# Patient Record
(demographics unavailable — no encounter records)

---

## 2024-10-17 NOTE — PHYSICAL EXAM
[Eitan: ____] : Eitan [unfilled] [Sacral Dimple] : no sacral dimple [Tuft of Hair] : no tuft of hair [Straight] : straight [NL] : normotonic [FreeTextEntry5] : icteric sclera [de-identified] : jaundice face

## 2024-10-17 NOTE — RISK ASSESSMENT
[Does not require G6PD quantitative test] : Does not require G6PD quantitative test  [Presents with hemolytic anemia] : Does not present with hemolytic anemia  [Presents with hemolytic jaundice] : Does not present with hemolytic jaundice  [Presents with early onset increasing  jaundice persisting beyond the first week of life (bilirubin level greater than the 40th percentile] : Does not present with early onset increasing  jaundice persisting beyond the first week of life (bilirubin level greater than the 40th percentile for age in hours)   [Is admitted to the hospital for jaundice following discharge] : Is not admitted to the hospital for jaundice following discharge   [Has a racial, or ethnic risk of G6PD deficiency (, , Mediterranean, or  ancestry)] : Does not have a racial, or ethnic risk of G6PD deficiency (, , Mediterranean, or  ancestry)  [Has family history of G6PD deficiency (Symptoms include anemia and jaundice following illness, ingestion of osmani beans or bitter melon,] : Does not have family history of G6PD deficiency (Symptoms include anemia and jaundice following illness, ingestion of osmani beans or bitter melon, exposure to barbra compounds or mothballs, or after taking certain medications (including but not limited to sulfa-containing drugs, primaquine, dapsone, fluoroquinolones, nitrofurantoin, pyridium, sulfonylureas, etc.)

## 2024-10-17 NOTE — HISTORY OF PRESENT ILLNESS
[de-identified] : Weight check, jaundice [FreeTextEntry6] : Breastfeeding ad mich, taking pumped breast milk and formula 2-2.5 oz every 2-3 hours. Making at least 6 wet diapers per day. Stooling 3-4 times per day, yellow in color.  No spit ups.

## 2024-10-17 NOTE — HISTORY OF PRESENT ILLNESS
[Born at ___ Wks Gestation] : The patient was born at [unfilled] weeks gestation [] : via normal spontaneous vaginal delivery [[facility]] : at [facility] [Age: ___] : [unfilled] year old mother [Breast milk] : breast milk [Formula ___ oz/feed] : [unfilled] oz of formula per feed [Normal] : Normal [___ voids per day] : [unfilled] voids per day [per day] : per day. [Yellow] : yellow [In Bassinet/Crib] : sleeps in bassinet/crib [On back] : sleeps on back [Rear facing car seat in back seat] : Rear facing car seat in back seat [Hepatitis B Vaccine Given] : Hepatitis B vaccine given [NO] : No [BW: _____] : weight of [unfilled] [Length: _____] : length of [unfilled] [HC: _____] : head circumference of [unfilled] [(1) _____] : [unfilled] [(5) _____] : [unfilled] [G: ___] : G [unfilled] [P: ___] : P [unfilled] [Rubella (Immune)] : Rubella immune [None] : There are no risk factors [] : positive [TsB: _____] : Total Serum Bilirubin [unfilled] mg/dL [Hours between feeds ___] : Child is fed every [unfilled] hours [Frequency of stools: ___] : Frequency of stools: [unfilled]  stools [Smoke Detectors] : Smoke detectors at home. [FreeTextEntry1] : 13.7 [Carbon Monoxide Detectors] : Carbon monoxide detectors at home [No] : No [RSV vaccine] : RSV vaccine not received by mother at least 14 days prior to delivery [HepBsAG] : HepBsAg negative [HIV] : HIV negative [HepC] : Hepatitis C negative [GBS] : GBS negative [FreeTextEntry9] : A+ [de-identified] : 60 [FreeTextEntry8] : SGA, fingerstick glucose checks performed.  [Co-sleeping] : no co-sleeping [Loose bedding, pillow, toys, and/or bumpers in crib] : no loose bedding, pillow, toys, and/or bumpers in crib [Pacifier] : Not using pacifier [Exposure to electronic nicotine delivery system] : No exposure to electronic nicotine delivery system

## 2024-10-17 NOTE — PHYSICAL EXAM
[Eitan: ____] : Eitan [unfilled] [Sacral Dimple] : no sacral dimple [Tuft of Hair] : no tuft of hair [Straight] : straight [NL] : normotonic [FreeTextEntry5] : icteric sclera [de-identified] : jaundice face

## 2024-10-17 NOTE — PHYSICAL EXAM
[Alert] : alert [Normocephalic] : normocephalic [Flat Open Anterior [place]] : flat open anterior fontanelle [PERRL] : PERRL [Red Reflex Bilateral] : red reflex bilateral [Normally Placed Ears] : normally placed ears [Auricles Well Formed] : auricles well formed [Clear Tympanic membranes] : clear tympanic membranes [Light reflex present] : light reflex present [Bony structures visible] : bony structures visible [Patent Auditory Canal] : patent auditory canal [Nares Patent] : nares patent [Palate Intact] : palate intact [Uvula Midline] : uvula midline [Supple, full passive range of motion] : supple, full passive range of motion [Symmetric Chest Rise] : symmetric chest rise [Clear to Auscultation Bilaterally] : clear to auscultation bilaterally [Regular Rate and Rhythm] : regular rate and rhythm [S1, S2 present] : S1, S2 present [+2 Femoral Pulses] : +2 femoral pulses [Soft] : soft [Bowel Sounds] : bowel sounds present [Umbilical Stump Dry, Clean, Intact] : umbilical stump dry, clean, intact [Normal external genitalia] : normal external genitalia [Patent Vagina] : patent vagina [Patent] : patent [Normally Placed] : normally placed [No Abnormal Lymph Nodes Palpated] : no abnormal lymph nodes palpated [Symmetric Flexed Extremities] : symmetric flexed extremities [Startle Reflex] : startle reflex present [Suck Reflex] : suck reflex present [Rooting] : rooting reflex present [Palmar Grasp] : palmar grasp present [Plantar Grasp] : plantar reflex present [Symmetric Kenji] : symmetric [place] [Icteric sclera] : icteric sclera [Jaundice] : jaundice [Acute Distress] : no acute distress [Discharge] : no discharge [Palpable Masses] : no palpable masses [Murmurs] : no murmurs [Tender] : nontender [Distended] : not distended [Hepatomegaly] : no hepatomegaly [Splenomegaly] : no splenomegaly [Clitoromegaly] : no clitoromegaly [Mayberry-Ortolani] : negative Mayberry-Ortolani [Spinal Dimple] : no spinal dimple [Tuft of Hair] : no tuft of hair

## 2024-10-17 NOTE — DISCUSSION/SUMMARY
[Normal Growth] : growth [Normal Development] : developmental [No Elimination Concerns] : elimination [Continue Regimen] : feeding [No Skin Concerns] : skin [Normal Sleep Pattern] : sleep [None] : no known medical problems [Anticipatory Guidance Given] : Anticipatory guidance addressed as per the history of present illness section [ Transition] :  transition [ Care] :  care [Nutritional Adequacy] : nutritional adequacy [Parental Well-Being] : parental well-being [Safety] : safety [Hepatitis B In Hospital] : Hepatitis B administered while in the hospital [No Vaccines] : no vaccines needed [No Medications] : ~He/She~ is not on any medications [Parent/Guardian] : Parent/Guardian [] : The components of the vaccine(s) to be administered today are listed in the plan of care. The disease(s) for which the vaccine(s) are intended to prevent and the risks have been discussed with the caretaker.  The risks are also included in the appropriate vaccination information statements which have been provided to the patient's caregiver.  The caregiver has given consent to vaccinate. [FreeTextEntry1] : Recommend exclusive breastfeeding, 8-12 feedings per day. Mother should continue prenatal vitamins and avoid alcohol. If formula is needed, recommend iron-fortified formulations, 2-4 oz every 2-3 hrs. When in car, patient should be in rear-facing car seat in back seat. Put baby to sleep on back, in own crib with no loose or soft bedding. Help baby to develop sleep and feeding routines. May offer pacifier if needed. Start tummy time when awake. Limit baby's exposure to others, especially those with fever or unknown vaccine status. Parents counseled to call if rectal temperature >100.4 degrees F.  Referral for counseling for positive postpartum depression screen.

## 2024-10-17 NOTE — HISTORY OF PRESENT ILLNESS
[Born at ___ Wks Gestation] : The patient was born at [unfilled] weeks gestation [] : via normal spontaneous vaginal delivery [[facility]] : at [facility] [Age: ___] : [unfilled] year old mother [Breast milk] : breast milk [Formula ___ oz/feed] : [unfilled] oz of formula per feed [Normal] : Normal [___ voids per day] : [unfilled] voids per day [per day] : per day. [Yellow] : yellow [In Bassinet/Crib] : sleeps in bassinet/crib [On back] : sleeps on back [Rear facing car seat in back seat] : Rear facing car seat in back seat [Hepatitis B Vaccine Given] : Hepatitis B vaccine given [NO] : No [BW: _____] : weight of [unfilled] [Length: _____] : length of [unfilled] [HC: _____] : head circumference of [unfilled] [(1) _____] : [unfilled] [(5) _____] : [unfilled] [G: ___] : G [unfilled] [P: ___] : P [unfilled] [Rubella (Immune)] : Rubella immune [None] : There are no risk factors [] : positive [TsB: _____] : Total Serum Bilirubin [unfilled] mg/dL [Hours between feeds ___] : Child is fed every [unfilled] hours [Frequency of stools: ___] : Frequency of stools: [unfilled]  stools [Smoke Detectors] : Smoke detectors at home. [FreeTextEntry1] : 13.7 [Carbon Monoxide Detectors] : Carbon monoxide detectors at home [No] : No [RSV vaccine] : RSV vaccine not received by mother at least 14 days prior to delivery [HepBsAG] : HepBsAg negative [HIV] : HIV negative [HepC] : Hepatitis C negative [GBS] : GBS negative [FreeTextEntry9] : A+ [de-identified] : 60 [FreeTextEntry8] : SGA, fingerstick glucose checks performed.  [Co-sleeping] : no co-sleeping [Loose bedding, pillow, toys, and/or bumpers in crib] : no loose bedding, pillow, toys, and/or bumpers in crib [Pacifier] : Not using pacifier [Exposure to electronic nicotine delivery system] : No exposure to electronic nicotine delivery system

## 2024-10-17 NOTE — PHYSICAL EXAM
[Alert] : alert [Normocephalic] : normocephalic [Flat Open Anterior [place]] : flat open anterior fontanelle [PERRL] : PERRL [Red Reflex Bilateral] : red reflex bilateral [Normally Placed Ears] : normally placed ears [Auricles Well Formed] : auricles well formed [Clear Tympanic membranes] : clear tympanic membranes [Light reflex present] : light reflex present [Bony structures visible] : bony structures visible [Patent Auditory Canal] : patent auditory canal [Nares Patent] : nares patent [Palate Intact] : palate intact [Uvula Midline] : uvula midline [Supple, full passive range of motion] : supple, full passive range of motion [Symmetric Chest Rise] : symmetric chest rise [Clear to Auscultation Bilaterally] : clear to auscultation bilaterally [Regular Rate and Rhythm] : regular rate and rhythm [S1, S2 present] : S1, S2 present [+2 Femoral Pulses] : +2 femoral pulses [Soft] : soft [Bowel Sounds] : bowel sounds present [Umbilical Stump Dry, Clean, Intact] : umbilical stump dry, clean, intact [Normal external genitalia] : normal external genitalia [Patent Vagina] : patent vagina [Patent] : patent [Normally Placed] : normally placed [No Abnormal Lymph Nodes Palpated] : no abnormal lymph nodes palpated [Symmetric Flexed Extremities] : symmetric flexed extremities [Startle Reflex] : startle reflex present [Suck Reflex] : suck reflex present [Rooting] : rooting reflex present [Palmar Grasp] : palmar grasp present [Plantar Grasp] : plantar reflex present [Symmetric Kenji] : symmetric [place] [Icteric sclera] : icteric sclera [Jaundice] : jaundice [Acute Distress] : no acute distress [Discharge] : no discharge [Palpable Masses] : no palpable masses [Murmurs] : no murmurs [Tender] : nontender [Distended] : not distended [Hepatomegaly] : no hepatomegaly [Splenomegaly] : no splenomegaly [Clitoromegaly] : no clitoromegaly [Mayberry-Ortolani] : negative Mayberry-Ortolani [Spinal Dimple] : no spinal dimple [Tuft of Hair] : no tuft of hair

## 2024-10-17 NOTE — HISTORY OF PRESENT ILLNESS
[de-identified] : Weight check, jaundice [FreeTextEntry6] : Breastfeeding ad mich, taking pumped breast milk and formula 2-2.5 oz every 2-3 hours. Making at least 6 wet diapers per day. Stooling 3-4 times per day, yellow in color.  No spit ups.

## 2024-10-31 NOTE — BEGINNING OF VISIT
[Mother] : mother Xerosis Aggressive Treatment: I recommended application of Cetaphil or CeraVe numerous times a day going to bed to all dry areas. I also prescribed a topical steroid for twice daily use.

## 2024-10-31 NOTE — PHYSICAL EXAM
[Eitan: ____] : Eitan [unfilled] [Normal External Genitalia] : normal external genitalia [Sacral Dimple] : no sacral dimple [NL] : normotonic [de-identified] : slate grey nevus on buttock, nevus simplex on glabella, jaundice face

## 2024-10-31 NOTE — HISTORY OF PRESENT ILLNESS
[de-identified] : Weight check [FreeTextEntry6] : Breastfeeding ad mich and supplementing with pumped breast milk and formula 2.5-3.5 oz every 2-3 hours.  At least 6-8 wet diapers per day. Making 5-6 stools per day, yellow in color. No spit ups.

## 2024-10-31 NOTE — PHYSICAL EXAM
[Eitan: ____] : Eitan [unfilled] [Normal External Genitalia] : normal external genitalia [Sacral Dimple] : no sacral dimple [NL] : normotonic [de-identified] : slate grey nevus on buttock, nevus simplex on glabella, jaundice face

## 2024-10-31 NOTE — HISTORY OF PRESENT ILLNESS
[de-identified] : Weight check [FreeTextEntry6] : Breastfeeding ad mich and supplementing with pumped breast milk and formula 2.5-3.5 oz every 2-3 hours.  At least 6-8 wet diapers per day. Making 5-6 stools per day, yellow in color. No spit ups.

## 2024-11-13 NOTE — DISCUSSION/SUMMARY
[FreeTextEntry1] : Normal physical exam today.   If infant continues to vomit, fussy, change in behavior, pupil size asymmetry, or any other worrisome sign, report to ED immediately. Mother understands and is in agreement with plan.   To reduce reflux symptoms follow reflux precautions. Keep the baby upright after eating for 20 to 30 minutes after a feeding.  Keep baby away from cigarette smoke. Thicken formula may be helpful. Add 1-2 tablespoons of oat baby cereal to baby's bottle. Alternatively, activate rice formula is available.

## 2024-11-13 NOTE — PHYSICAL EXAM
[Straight] : straight [NL] : warm, clear [FreeTextEntry2] : AFOF, NCAT, no bruising, no swelling [FreeTextEntry5] : DAISY ALLEN/L

## 2024-11-13 NOTE — HISTORY OF PRESENT ILLNESS
[de-identified] : Emesis [FreeTextEntry6] : Yesterday as mom was carrying infant, mother accidentally gently tapped his head on the door jamb.  Infant cried for a few seconds and then was behaving well.  No fussiness, no head swelling, no head bruising.  Tolerating feeds and voiding well. Around 20 hours after incident, patient appeared hungry.  Mother states that she overfed infant and belly was gassy.   Had 1 episode of NBNB emesis, which occurred around 2 hours ago.  Infant has not been fussy since.

## 2024-11-14 NOTE — DISCUSSION/SUMMARY
[Normal Growth] : growth [Normal Development] : development  [No Elimination Concerns] : elimination [Continue Regimen] : feeding [No Skin Concerns] : skin [Normal Sleep Pattern] : sleep [None] : no medical problems [Anticipatory Guidance Given] : Anticipatory guidance addressed as per the history of present illness section [Parental Well-Being] : parental well-being [Family Adjustment] : family adjustment [Feeding Routines] : feeding routines [Infant Adjustment] : infant adjustment [Safety] : safety [Age Approp Vaccines] : Age appropriate vaccines administered [No Medications] : ~He/She~ is not on any medications [Parent/Guardian] : Parent/Guardian [] : The components of the vaccine(s) to be administered today are listed in the plan of care. The disease(s) for which the vaccine(s) are intended to prevent and the risks have been discussed with the caretaker.  The risks are also included in the appropriate vaccination information statements which have been provided to the patient's caregiver.  The caregiver has given consent to vaccinate. [FreeTextEntry1] : Recommend exclusive breastfeeding, 8-12 feedings per day. Mother should continue prenatal vitamins and avoid alcohol. If formula is needed, recommend iron-fortified formulations, 2-4 oz every 2-3 hrs. When in car, patient should be in rear-facing car seat in back seat. Put baby to sleep on back, in own crib with no loose or soft bedding. Help baby to develop sleep and feeding routines. May offer pacifier if needed. Start tummy time when awake. Limit baby's exposure to others, especially those with fever or unknown vaccine status. Parents counseled to call if rectal temperature >100.4 degrees F.  Advised counseling for positive postpartum depression screen.  Place breast milk on face for acne.

## 2024-11-14 NOTE — HISTORY OF PRESENT ILLNESS
[Parents] : parents [Breast milk] : breast milk [Formula ___ oz/feed] : [unfilled] oz of formula per feed [Hours between feeds ___] : Child is fed every [unfilled] hours [Normal] : Normal [Frequency of stools: ___] : Frequency of stools: [unfilled]  stools [Yellow] : yellow [In Bassinet/Crib] : sleeps in bassinet/crib [On back] : sleeps on back [No] : No cigarette smoke exposure [Rear facing car seat in back seat] : Rear facing car seat in back seat [Carbon Monoxide Detectors] : Carbon monoxide detectors at home [Smoke Detectors] : Smoke detectors at home. [NO] : No [per day] : per day. [Co-sleeping] : no co-sleeping [Loose bedding, pillow, toys, and/or bumpers in crib] : no loose bedding, pillow, toys, and/or bumpers in crib [Exposure to electronic nicotine delivery system] : No exposure to electronic nicotine delivery system [de-identified] : baby acne

## 2024-11-14 NOTE — PHYSICAL EXAM
[Alert] : alert [Normocephalic] : normocephalic [Flat Open Anterior [place]] : flat open anterior fontanelle [PERRL] : PERRL [Red Reflex Bilateral] : red reflex bilateral [Normally Placed Ears] : normally placed ears [Auricles Well Formed] : auricles well formed [Clear Tympanic membranes] : clear tympanic membranes [Light reflex present] : light reflex present [Bony landmarks visible] : bony landmarks visible [Nares Patent] : nares patent [Palate Intact] : palate intact [Uvula Midline] : uvula midline [Supple, full passive range of motion] : supple, full passive range of motion [Symmetric Chest Rise] : symmetric chest rise [Clear to Auscultation Bilaterally] : clear to auscultation bilaterally [Regular Rate and Rhythm] : regular rate and rhythm [S1, S2 present] : S1, S2 present [+2 Femoral Pulses] : +2 femoral pulses [Soft] : soft [Bowel Sounds] : bowel sounds present [Normal external genitailia] : normal external genitalia [Patent Vagina] : vagina patent [Normally Placed] : normally placed [No Abnormal Lymph Nodes Palpated] : no abnormal lymph nodes palpated [Symmetric Flexed Extremities] : symmetric flexed extremities [Straight] : straight [Startle Reflex] : startle reflex present [Suck Reflex] : suck reflex present [Rooting] : rooting reflex present [Palmar Grasp] : palmar grasp reflex present [Plantar Grasp] : plantar grasp reflex present [Symmetric Kenji] : symmetric [place] [Acute Distress] : no acute distress [Discharge] : no discharge [Palpable Masses] : no palpable masses [Murmurs] : no murmurs [Tender] : nontender [Distended] : not distended [Hepatomegaly] : no hepatomegaly [Splenomegaly] : no splenomegaly [Clitoromegaly] : no clitoromegaly [Clavicular Crepitus] : no clavicular crepitus [Mayberry-Ortolani] : negative Mayberry-Ortolani [Spinal Dimple] : no spinal dimple [Tuft of Hair] : no tuft of hair [Jaundice] : no jaundice [Rash and/or lesion present] : no rash/lesion [FreeTextEntry6] : clemencia stage I nml female

## 2024-12-17 NOTE — PHYSICAL EXAM
[Alert] : alert [Acute Distress] : no acute distress [Normocephalic] : normocephalic [Flat Open Anterior [place]] : flat open anterior fontanelle [PERRL] : PERRL [Red Reflex Bilateral] : red reflex bilateral [Normally Placed Ears] : normally placed ears [Auricles Well Formed] : auricles well formed [Clear Tympanic membranes] : clear tympanic membranes [Light reflex present] : light reflex present [Bony landmarks visible] : bony landmarks visible [Discharge] : no discharge [Nares Patent] : nares patent [Palate Intact] : palate intact [Uvula Midline] : uvula midline [Supple, full passive range of motion] : supple, full passive range of motion [Palpable Masses] : no palpable masses [Symmetric Chest Rise] : symmetric chest rise [Clear to Auscultation Bilaterally] : clear to auscultation bilaterally [Regular Rate and Rhythm] : regular rate and rhythm [S1, S2 present] : S1, S2 present [Murmurs] : no murmurs [+2 Femoral Pulses] : +2 femoral pulses [Soft] : soft [Tender] : nontender [Distended] : not distended [Bowel Sounds] : bowel sounds present [Hepatomegaly] : no hepatomegaly [Splenomegaly] : no splenomegaly [Normal external genitailia] : normal external genitalia [Clitoromegaly] : no clitoromegaly [Patent Vagina] : vagina patent [Normally Placed] : normally placed [No Abnormal Lymph Nodes Palpated] : no abnormal lymph nodes palpated [Clavicular Crepitus] : no clavicular crepitus [Mayberry-Ortolani] : negative Mayberry-Ortolani [Symmetric Flexed Extremities] : symmetric flexed extremities [Spinal Dimple] : no spinal dimple [Tuft of Hair] : no tuft of hair [Straight] : straight [Startle Reflex] : startle reflex present [Suck Reflex] : suck reflex present [Rooting] : rooting reflex present [Palmar Grasp] : palmar grasp reflex present [Plantar Grasp] : plantar grasp reflex present [Symmetric Kenji] : symmetric [place] [Rash and/or lesion present] : no rash/lesion [FreeTextEntry6] : clemencia stage I nml female

## 2024-12-17 NOTE — HISTORY OF PRESENT ILLNESS
[Mother] : mother [Breast milk] : breast milk [Normal] : Normal [___ voids per day] : [unfilled] voids per day [Frequency of stools: ___] : Frequency of stools: [unfilled]  stools [per day] : per day. [Yellow] : yellow [Firm] : firm consistency [In Bassinet/Crib] : sleeps in bassinet/crib [On back] : sleeps on back [Pacifier use] : Pacifier use [No] : No cigarette smoke exposure [Rear facing car seat in back seat] : Rear facing car seat in back seat [Carbon Monoxide Detectors] : Carbon monoxide detectors at home [Smoke Detectors] : Smoke detectors at home. [NO] : No [Formula ___ oz in 24hrs] : [unfilled] oz of formula in 24 hours [Vitamins ___] : no vitamins [Co-sleeping] : no co-sleeping [Loose bedding, pillow, toys, and/or bumpers in crib] : no loose bedding, pillow, toys, and/or bumpers in crib [Exposure to electronic nicotine delivery system] : No exposure to electronic nicotine delivery system [At risk for exposure to TB] : Not at risk for exposure to Tuberculosis

## 2024-12-17 NOTE — PHYSICAL EXAM
[Alert] : alert [Acute Distress] : no acute distress [Normocephalic] : normocephalic [Flat Open Anterior [place]] : flat open anterior fontanelle [PERRL] : PERRL [Red Reflex Bilateral] : red reflex bilateral [Normally Placed Ears] : normally placed ears [Auricles Well Formed] : auricles well formed [Clear Tympanic membranes] : clear tympanic membranes [Light reflex present] : light reflex present [Bony landmarks visible] : bony landmarks visible [Discharge] : no discharge [Nares Patent] : nares patent [Palate Intact] : palate intact [Uvula Midline] : uvula midline [Supple, full passive range of motion] : supple, full passive range of motion [Palpable Masses] : no palpable masses [Symmetric Chest Rise] : symmetric chest rise [Clear to Auscultation Bilaterally] : clear to auscultation bilaterally [Regular Rate and Rhythm] : regular rate and rhythm [S1, S2 present] : S1, S2 present [Murmurs] : no murmurs [+2 Femoral Pulses] : +2 femoral pulses [Soft] : soft [Tender] : nontender [Distended] : not distended [Bowel Sounds] : bowel sounds present [Hepatomegaly] : no hepatomegaly [Splenomegaly] : no splenomegaly [Normal external genitailia] : normal external genitalia [Clitoromegaly] : no clitoromegaly [Patent Vagina] : vagina patent [Normally Placed] : normally placed [No Abnormal Lymph Nodes Palpated] : no abnormal lymph nodes palpated [Clavicular Crepitus] : no clavicular crepitus [Mabyerry-Ortolani] : negative Mayberry-Ortolani [Symmetric Flexed Extremities] : symmetric flexed extremities [Spinal Dimple] : no spinal dimple [Tuft of Hair] : no tuft of hair [Straight] : straight [Startle Reflex] : startle reflex present [Suck Reflex] : suck reflex present [Rooting] : rooting reflex present [Palmar Grasp] : palmar grasp reflex present [Plantar Grasp] : plantar grasp reflex present [Symmetric Kenji] : symmetric [place] [Rash and/or lesion present] : no rash/lesion [FreeTextEntry6] : clemencia stage I nml female

## 2024-12-29 NOTE — HISTORY OF PRESENT ILLNESS
[de-identified] : Sneezing [FreeTextEntry6] : Patient has been sneezing and having watery eyes since yesterday. No fever. No cough. Tolerating PO intake and voiding well. No rhinorrhea. No emesis or diarrhea. Sibling also has URI signs/symptoms.

## 2024-12-29 NOTE — DISCUSSION/SUMMARY
[FreeTextEntry1] : Symptoms likely due to viral URI. Recommend supportive care including nasal saline followed by nasal suction, cool mist humidifier. Return if symptoms worsen or persist.  Contact PMD if there is a fever.

## 2024-12-29 NOTE — HISTORY OF PRESENT ILLNESS
[de-identified] : Sneezing [FreeTextEntry6] : Patient has been sneezing and having watery eyes since yesterday. No fever. No cough. Tolerating PO intake and voiding well. No rhinorrhea. No emesis or diarrhea. Sibling also has URI signs/symptoms.

## 2025-01-15 NOTE — HISTORY OF PRESENT ILLNESS
[de-identified] : fever [FreeTextEntry6] : pt has been having fever since yesterday and has cough fever tmax 100.8 dry cough with clear runny nose x 1 day mother is sick

## 2025-02-24 NOTE — DEVELOPMENTAL MILESTONES
[Laughs aloud] : laughs aloud [Turns to voice] : turns to voice [Vocalizes with extending cooing] : vocalizes with extending cooing [Supports on elbows & wrists in prone] : supports on elbows and wrists in prone [Keeps hands unfisted] : keeps hands unfisted [Plays with fingers in midline] : plays with fingers in midline [Grasps objects] : grasps objects [Normal Development] : Normal Development [None] : none [Not Passed] : not passed [Rolls over prone to supine] : does not roll over prone to supine

## 2025-02-24 NOTE — HISTORY OF PRESENT ILLNESS
[Mother] : mother [Breast milk] : breast milk [Normal] : Normal [___ voids per day] : [unfilled] voids per day [Frequency of stools: ___] : Frequency of stools: [unfilled]  stools [Yellow] : yellow [Pasty] : pasty [Seedy] : seedy [In Bassinet/Crib] : sleeps in bassinet/crib [On back] : sleeps on back [Sleeps 12-16 hours per 24 hours (including naps)] : sleeps 12-16 hours per 24 hours (including naps) [Tummy time] : tummy time [No] : No cigarette smoke exposure [Rear facing car seat in back seat] : Rear facing car seat in back seat [Carbon Monoxide Detectors] : Carbon monoxide detectors at home [Smoke Detectors] : Smoke detectors at home. [NO] : No [Formula ___ oz in 24hrs] : [unfilled] oz of formula in 24 hours [Vitamins ___] : Patient takes [unfilled] vitamins daily [per day] : per day. [Fruits] : no fruits [Vegetables] : no vegetables [Cereal] : no cereal [Co-sleeping] : no co-sleeping [Loose bedding, pillow, toys, and/or bumpers in crib] : no loose bedding, pillow, toys, and/or bumpers in crib [Pacifier use] : not using pacifier [Screen time only for video chatting] : screen time not just for video chatting [Exposure to electronic nicotine delivery system] : No exposure to electronic nicotine delivery system

## 2025-02-24 NOTE — DISCUSSION/SUMMARY
[Normal Growth] : growth [Normal Development] : development  [No Elimination Concerns] : elimination [Continue Regimen] : feeding [No Skin Concerns] : skin [Normal Sleep Pattern] : sleep [None] : no medical problems [Anticipatory Guidance Given] : Anticipatory guidance addressed as per the history of present illness section [Family Functioning] : family functioning [Nutritional Adequacy and Growth] : nutritional adequacy and growth [Infant Development] : infant development [Oral Health] : oral health [Safety] : safety [Age Approp Vaccines] : Age appropriate vaccines administered [No Medications] : ~He/She~ is not on any medications [Parent/Guardian] : Parent/Guardian [FreeTextEntry1] : Recommend breastfeeding, 8-12 feedings per day. Mother should continue prenatal vitamins and avoid alcohol. If formula is needed, recommend iron-fortified formulations, 2-4 oz every 3-4 hrs. Cereal may be introduced using a spoon and bowl. When in car, patient should be in rear-facing car seat in back seat. Put baby to sleep on back, in own crib with no loose or soft bedding. Lower crib mattress. Help baby to maintain sleep and feeding routines. May offer pacifier if needed. Continue tummy time when awake.  Massage oil in to scalp 5 minutes before bathing infant to treat seborrhea. While shampooing lift flakes with fingers.  Referral for counseling for positive [place].  Mother denies suicidal/homicidal ideation.  No plan of hurting herself or infant.

## 2025-02-24 NOTE — PHYSICAL EXAM
[Alert] : alert [Acute Distress] : no acute distress [Normocephalic] : normocephalic [Flat Open Anterior [place]] : flat open anterior fontanelle [Red Reflex] : red reflex bilateral [PERRL] : PERRL [Normally Placed Ears] : normally placed ears [Auricles Well Formed] : auricles well formed [Clear Tympanic membranes] : clear tympanic membranes [Light reflex present] : light reflex present [Bony landmarks visible] : bony landmarks visible [Discharge] : no discharge [Nares Patent] : nares patent [Palate Intact] : palate intact [Uvula Midline] : uvula midline [Palpable Masses] : no palpable masses [Symmetric Chest Rise] : symmetric chest rise [Clear to Auscultation Bilaterally] : clear to auscultation bilaterally [Regular Rate and Rhythm] : regular rate and rhythm [S1, S2 present] : S1, S2 present [Murmurs] : no murmurs [+2 Femoral Pulses] : (+) 2 femoral pulses [Soft] : soft [Tender] : nontender [Distended] : nondistended [Bowel Sounds] : bowel sounds present [Hepatomegaly] : no hepatomegaly [Splenomegaly] : no splenomegaly [Normal External Genitalia] : normal external genitalia [Clitoromegaly] : no clitoromegaly [Normal Vaginal Introitus] : normal vaginal introitus [Patent] : patent [Normally Placed] : normally placed [No Abnormal Lymph Nodes Palpated] : no abnormal lymph nodes palpated [Mayberry-Ortolani] : negative Mayberry-Ortolani [Allis Sign] : negative Allis sign [Spinal Dimple] : no spinal dimple [Tuft of Hair] : no tuft of hair [Straight] : straight [Startle Reflex] : startle reflex present [Plantar Grasp] : plantar grasp reflex present [Symmetric Kenji] : symmetric kenji [FreeTextEntry6] : clemencia stage I nml female [de-identified] : cradle cap

## 2025-04-16 NOTE — HISTORY OF PRESENT ILLNESS
[Mother] : mother [Breast milk] : breast milk [Fruits] : fruits [Vegetables] : vegetables [Cereal] : cereal [Egg] : egg [Normal] : Normal [Frequency of stools: ___] : Frequency of stools: [unfilled]  stools [per day] : per day. [Green/brown] : green/brown [Yellow] : yellow [Pasty] : pasty [In Bassinet/Crib] : sleeps in bassinet/crib [On back] : sleeps on back [Sleeps 12-16 hours per 24 hours (including naps)] : sleeps 12-16 hours per 24 hours (including naps) [Tummy time] : tummy time [No] : No cigarette smoke exposure [Rear facing car seat in back seat] : Rear facing car seat in back seat [Carbon Monoxide Detectors] : Carbon monoxide detectors at home [Smoke Detectors] : Smoke detectors at home. [NO] : No [Vitamins ___] : Patient takes [unfilled] vitamins daily [Meat] : no meat [Fish] : no fish [Peanut] : no peanut [Dairy] : no dairy [Co-sleeping] : no co-sleeping [Loose bedding, pillow, toys, and/or bumpers in crib] : no loose bedding, pillow, toys, and/or bumpers in crib [Pacifier use] : not using pacifier [Screen time only for video chatting] : screen time not just for video chatting [Exposure to electronic nicotine delivery system] : No exposure to electronic nicotine delivery system [de-identified] : Erythema on neck [de-identified] : egg-yolk

## 2025-04-16 NOTE — DISCUSSION/SUMMARY
[Normal Growth] : growth [Normal Development] : development [None] : No medical problems [No Elimination Concerns] : elimination [No Feeding Concerns] : feeding [No Skin Concerns] : skin [Normal Sleep Pattern] : sleep [Family Functioning] : family functioning [Nutrition and Feeding] : nutrition and feeding [Infant Development] : infant development [Oral Health] : oral health [Safety] : safety [No Medications] : ~He/She~ is not on any medications [Parent/Guardian] : parent/guardian [] : The components of the vaccine(s) to be administered today are listed in the plan of care. The disease(s) for which the vaccine(s) are intended to prevent and the risks have been discussed with the caretaker.  The risks are also included in the appropriate vaccination information statements which have been provided to the patient's caregiver.  The caregiver has given consent to vaccinate. [FreeTextEntry1] : Recommend breastfeeding, 8-12 feedings per day. If formula is needed, 2-4 oz every 3-4 hrs. Introduce single-ingredient foods rich in iron, one at a time. Incorporate up to 4 oz of fluorinated water daily in a sippy cup. When teeth erupt wipe daily with washcloth. When in car, patient should be in rear-facing car seat in back seat. Put baby to sleep on back, in own crib with no loose or soft bedding. Lower crib mattress. Help baby to maintain sleep and feeding routines. May offer pacifier if needed. Continue tummy time when awake. Ensure home is safe since baby is now more mobile. Do not use infant walker. Read aloud to baby.  Keep neck skin dry. Apply nystatin as discussed.

## 2025-04-16 NOTE — PHYSICAL EXAM
[Alert] : alert [Acute Distress] : no acute distress [Normocephalic] : normocephalic [Flat Open Anterior [place]] : flat open anterior fontanelle [Red Reflex] : red reflex bilateral [PERRL] : PERRL [Normally Placed Ears] : normally placed ears [Auricles Well Formed] : auricles well formed [Clear Tympanic membranes] : clear tympanic membranes [Light reflex present] : light reflex present [Bony landmarks visible] : bony landmarks visible [Discharge] : no discharge [Nares Patent] : nares patent [Palate Intact] : palate intact [Uvula Midline] : uvula midline [Tooth Eruption] : no tooth eruption [Supple, full passive range of motion] : supple, full passive range of motion [Palpable Masses] : no palpable masses [Symmetric Chest Rise] : symmetric chest rise [Clear to Auscultation Bilaterally] : clear to auscultation bilaterally [Regular Rate and Rhythm] : regular rate and rhythm [S1, S2 present] : S1, S2 present [Murmurs] : no murmurs [+2 Femoral Pulses] : (+) 2 femoral pulses [Soft] : soft [Tender] : nontender [Distended] : nondistended [Bowel Sounds] : bowel sounds present [Hepatomegaly] : no hepatomegaly [Splenomegaly] : no splenomegaly [Normal External Genitalia] : normal external genitalia [Clitoromegaly] : no clitoromegaly [Normal Vaginal Introitus] : normal vaginal introitus [Patent] : patent [Normally Placed] : normally placed [No Abnormal Lymph Nodes Palpated] : no abnormal lymph nodes palpated [Mayberry-Ortolani] : negative Mayberry-Ortolani [Allis Sign] : negative Allis sign [Symmetric Buttocks Creases] : symmetric buttocks creases [Spinal Dimple] : no spinal dimple [Tuft of Hair] : no tuft of hair [Plantar Grasp] : plantar grasp reflex present [Cranial Nerves Grossly Intact] : cranial nerves grossly intact [de-identified] : clemencia stage I nml female [de-identified] : erythema neck

## 2025-04-16 NOTE — DEVELOPMENTAL MILESTONES
[Pats or smiles at reflection] : pats or smiles at reflection [Begins to turn when name called] : begins to turn when name called [Babbles] : babbles [Rolls over prone to supine] : rolls over prone to supine [Reaches for object and transfers] : reaches for object and transfers [Rakes small object with 4 fingers] : rakes small object with 4 fingers [[place] small object on surface] : bangs small object on surface [Normal Development] : Normal Development [None] : none [Sits briefly without support] : sits briefly without support [Passed] : passed

## 2025-05-01 NOTE — HISTORY OF PRESENT ILLNESS
[FreeTextEntry6] : pt hasn't poop for 4 to 5 days and was vomiting a lot taking solids, fruits, veggies  mother gave prune, blueberry, [place], bananas  giving water, breastfeeding   dry skin on hands and legs Itchy

## 2025-05-01 NOTE — REVIEW OF SYSTEMS
[Vomiting] : vomiting [Diarrhea] : no diarrhea [Constipation] : constipation [Dry Skin] : dry skin [Itching] : itching [Negative] : Genitourinary

## 2025-05-01 NOTE — PHYSICAL EXAM
[Irritable] : irritable [Guarding] : guarding [NL] : warm, clear [Excoriated] : excoriated [de-identified] : bilateral legs

## 2025-05-01 NOTE — DISCUSSION/SUMMARY
[FreeTextEntry1] : Patient with large BM in office before entering room   Recommend increased dietary fiber and mommy bliss probiotic daily. Prunes. Plums, peaches, pears Water, can give 1-2 oz of prune juice, warm bath, bicycle legs  Recommend daily moisturizer and topical steroid prn for atopic dermatitis.

## 2025-05-18 NOTE — PHYSICAL EXAM
Bautista Almazan was seen and treated in our emergency department on 1/23/2024.  He may return to work on 01/25/2024.  Return 1/25 if feeling better      If you have any questions or concerns, please don't hesitate to call.      Marimar Gutiérrez, APRN-CNP [Erythema] : erythema [Bulging] : bulging [Mucoid Discharge] : mucoid discharge [NL] : warm, clear

## 2025-05-18 NOTE — HISTORY OF PRESENT ILLNESS
[de-identified] : ear pulling ear pain [FreeTextEntry6] : Blood on the outside of the right ear.  change in sleep pattern for 2 days now

## 2025-07-14 NOTE — PHYSICAL EXAM
[Alert] : alert [Acute Distress] : no acute distress [Normocephalic] : normocephalic [Flat Open Anterior [place]] : flat open anterior fontanelle [Red Reflex] : red reflex bilateral [Excessive Tearing] : no excessive tearing [PERRL] : PERRL [Normally Placed Ears] : normally placed ears [Auricles Well Formed] : auricles well formed [Clear Tympanic membranes] : clear tympanic membranes [Light reflex present] : light reflex present [Bony landmarks visible] : bony landmarks visible [Discharge] : no discharge [Nares Patent] : nares patent [Palate Intact] : palate intact [Uvula Midline] : uvula midline [Supple, full passive range of motion] : supple, full passive range of motion [Palpable Masses] : no palpable masses [Symmetric Chest Rise] : symmetric chest rise [Clear to Auscultation Bilaterally] : clear to auscultation bilaterally [Regular Rate and Rhythm] : regular rate and rhythm [S1, S2 present] : S1, S2 present [Murmurs] : no murmurs [+2 Femoral Pulses] : (+) 2 femoral pulses [Soft] : soft [Tender] : nontender [Distended] : nondistended [Bowel Sounds] : bowel sounds present [Hepatomegaly] : no hepatomegaly [Splenomegaly] : no splenomegaly [Normal External Genitalia] : normal external genitalia [Clitoromegaly] : no clitoromegaly [Normal Vaginal Introitus] : normal vaginal introitus [No Abnormal Lymph Nodes Palpated] : no abnormal lymph nodes palpated [Symmetric abduction and rotation of hips] : symmetric abduction and rotation of hips [Allis Sign] : negative Allis sign [Spinal Dimple] : no spinal dimple [Tuft of Hair] : no tuft of hair [Straight] : straight [Cranial Nerves Grossly Intact] : cranial nerves grossly intact [Rash or Lesions] : no rash/lesions [de-identified] : clemencia stage I nml female

## 2025-07-14 NOTE — DISCUSSION/SUMMARY

## 2025-07-14 NOTE — DEVELOPMENTAL MILESTONES
[Normal Development] : Normal Development [None] : none [Uses basic gestures] : uses basic gestures [Says "[place]" or "Mama"] : says "[place]" or "Mama" nonspecifically [Sits well without support] : sits well without support [Transitions between sitting and lying] : transitions between sitting and lying [Balances on hands and knees] : balances on hands and knees [Crawls] : crawls [Picks up small objects with 3 fingers] : picks up small objects with 3 fingers and thumb [[place] objects together] : bangs objects together

## 2025-07-14 NOTE — HISTORY OF PRESENT ILLNESS
[Breast milk] : breast milk [Fruit] : fruit [Vegetables] : vegetables [Cereal] : cereal [Meat] : meat [Eggs] : eggs [Fish] : fish [Peanut] : peanut [Dairy] : dairy [Baby food] : baby food [Finger foods] : finger foods [Water] : water [Normal] : Normal [Frequency of stools: ___] : Frequency of stools: [unfilled]  stools [In Crib] : sleeps in crib [Wakes up at night] : wakes up at night [Sleeps 12-16 hours per 24 hours (including naps)] : sleeps 12-16 hours per 24 hours (including naps) [Sippy Cup use] : sippy cup use [Screen time only for video chatting] : screen time only for video chatting [Rear facing car seat in  back seat] : Rear facing car seat in  back seat [Smoke Detectors] : Smoke detectors [Hours between feeds ___] : Child is fed every [unfilled] hours [per day] : per day. [Tap water] : Primary Fluoride Source: Tap water [No] : Not at  exposure [Carbon Monoxide Detectors] : Carbon monoxide detectors [Up to date] : Up to date [Vitamin ___] : no vitamins [Co-sleeping] : no co-sleeping [Loose bedding, pillow, toys, and/or bumpers in crib] : no loose bedding, pillow, toys, and/or bumpers in crib [Pacifier use] : not using pacifier [Bottle in bed] : not using bottle in bed [Brushing teeth] : not brushing teeth

## 2025-07-19 NOTE — CARE PLAN
[Care Plan reviewed and provided to patient/caregiver] : Care plan reviewed and provided to patient/caregiver [Understands and communicates without difficulty] : Patient/Caregiver understands and communicates without difficulty [FreeTextEntry3] : Fever as a natural response: Parents should understand that fevers are a normal part of the immune system's response to infection and are generally not dangerous.  Importance of overall appearance: The child's general well-being and appearance are more important than the exact fever temperature.  Antipyretics for comfort: Medications like acetaminophen or ibuprofen can help reduce fever and improve comfort, but they don't treat the underlying infection.  Safe antipyretic use: Parents should be advised on appropriate dosing based on the child's weight, avoid aspirin and over-the-counter cough and cold products, and not routinely alternate between different medications.  Reassurance about febrile seizures: Parents should be reassured that febrile seizures, while concerning, are usually benign and don't cause long-term damage.    When to Seek Medical Attention:   Infants under three months: Any fever in infants under three months should be evaluated by a doctor.  High fever: Fevers above 104F (40C) in older children require evaluation.  Serious symptoms: Symptoms like trouble breathing, lethargy, stiff neck, or a rash should prompt immediate medical attention.  Worsening condition: If the child's condition worsens or if the fever persists for more than 48-72 hours, it's important to consult a doctor.  General advice: Parents should be encouraged to call their pediatrician if they have any concerns about their child's health or if they are unsure how to proceed.    Home Care:   Encourage hydration: Children with fevers are more likely to become dehydrated, so plenty of fluids should be offered.  Rest and comfort: Rest and comfort measures like tepid sponging (if the child finds it comforting) can help.  Avoid over-dressing: Don't overdress the child, as this can make them uncomfortable.  Monitor for signs of dehydration: Keep an eye out for signs like crying without tears, dry mouth, or fewer wet diapers.

## 2025-07-19 NOTE — HISTORY OF PRESENT ILLNESS
[Fever] : FEVER [Acetaminophen] : acetaminophen [Last dose: _____] : last dose: [unfilled] [Decreased Appetite] : decreased appetite [Max Temp: ____] : Max temperature: [unfilled] [___ Day(s)] : [unfilled] day(s) [Intermittent] : intermittent [Playful] : playful [Known Exposure to COVID-19] : no known exposure to COVID-19 [Hx of recent COVID-19 infection] : no history of recent COVID-19 infection [Sick Contacts: ___] : no sick contacts [Change in sleep pattern] : change in sleep pattern [Runny Nose] : no runny nose [Nasal Congestion] : no nasal congestion [Teething] : teething [Cough] : no cough [Rash] : no rash [de-identified] : Pt started having fever yesterday.

## 2025-07-24 NOTE — DISCUSSION/SUMMARY
[FreeTextEntry1] : switch to augmentin for 8 day course  start probiotic daily  will get kidney/bladder u/s for +uti in child return in 10 days for repeat urine culture

## 2025-07-24 NOTE — HISTORY OF PRESENT ILLNESS
[FreeTextEntry6] : pt has a rash on the body on cephalexin for ecoli uti  afebrile now  here because dad notices that after every abx dose, child breaks out in rash no pictures provided